# Patient Record
Sex: FEMALE | Race: WHITE | NOT HISPANIC OR LATINO | Employment: FULL TIME | URBAN - METROPOLITAN AREA
[De-identification: names, ages, dates, MRNs, and addresses within clinical notes are randomized per-mention and may not be internally consistent; named-entity substitution may affect disease eponyms.]

---

## 2024-01-25 ENCOUNTER — LAB (OUTPATIENT)
Dept: LAB | Facility: LAB | Age: 44
End: 2024-01-25
Payer: COMMERCIAL

## 2024-01-25 ENCOUNTER — OFFICE VISIT (OUTPATIENT)
Dept: PRIMARY CARE | Facility: CLINIC | Age: 44
End: 2024-01-25
Payer: COMMERCIAL

## 2024-01-25 VITALS
HEART RATE: 65 BPM | BODY MASS INDEX: 28.17 KG/M2 | DIASTOLIC BLOOD PRESSURE: 80 MMHG | WEIGHT: 165 LBS | SYSTOLIC BLOOD PRESSURE: 128 MMHG | HEIGHT: 64 IN | OXYGEN SATURATION: 100 %

## 2024-01-25 DIAGNOSIS — E78.49 OTHER HYPERLIPIDEMIA: ICD-10-CM

## 2024-01-25 DIAGNOSIS — Z00.00 WELLNESS EXAMINATION: ICD-10-CM

## 2024-01-25 DIAGNOSIS — R74.01 ELEVATED TRANSAMINASE LEVEL: Primary | ICD-10-CM

## 2024-01-25 DIAGNOSIS — K76.0 FATTY LIVER: ICD-10-CM

## 2024-01-25 DIAGNOSIS — Z12.11 SCREENING FOR COLON CANCER: ICD-10-CM

## 2024-01-25 DIAGNOSIS — Z12.31 ENCOUNTER FOR SCREENING MAMMOGRAM FOR MALIGNANT NEOPLASM OF BREAST: Primary | ICD-10-CM

## 2024-01-25 DIAGNOSIS — R74.8 ELEVATED ALKALINE PHOSPHATASE LEVEL: ICD-10-CM

## 2024-01-25 PROBLEM — E78.5 HYPERLIPIDEMIA: Status: ACTIVE | Noted: 2024-01-25

## 2024-01-25 LAB
ALBUMIN SERPL BCP-MCNC: 5 G/DL (ref 3.4–5)
ALP SERPL-CCNC: 200 U/L (ref 33–110)
ALT SERPL W P-5'-P-CCNC: 105 U/L (ref 7–45)
ANION GAP SERPL CALC-SCNC: 12 MMOL/L (ref 10–20)
AST SERPL W P-5'-P-CCNC: 52 U/L (ref 9–39)
BASOPHILS # BLD AUTO: 0.08 X10*3/UL (ref 0–0.1)
BASOPHILS NFR BLD AUTO: 1 %
BILIRUB SERPL-MCNC: 2 MG/DL (ref 0–1.2)
BUN SERPL-MCNC: 14 MG/DL (ref 6–23)
CALCIUM SERPL-MCNC: 10.1 MG/DL (ref 8.6–10.3)
CHLORIDE SERPL-SCNC: 101 MMOL/L (ref 98–107)
CHOLEST SERPL-MCNC: 259 MG/DL (ref 0–199)
CHOLESTEROL/HDL RATIO: 3.9
CO2 SERPL-SCNC: 28 MMOL/L (ref 21–32)
CREAT SERPL-MCNC: 0.74 MG/DL (ref 0.5–1.05)
EGFRCR SERPLBLD CKD-EPI 2021: >90 ML/MIN/1.73M*2
EOSINOPHIL # BLD AUTO: 0.23 X10*3/UL (ref 0–0.7)
EOSINOPHIL NFR BLD AUTO: 2.8 %
ERYTHROCYTE [DISTWIDTH] IN BLOOD BY AUTOMATED COUNT: 12.6 % (ref 11.5–14.5)
GLUCOSE SERPL-MCNC: 91 MG/DL (ref 74–99)
HCT VFR BLD AUTO: 43.1 % (ref 36–46)
HDLC SERPL-MCNC: 67 MG/DL
HGB BLD-MCNC: 14.1 G/DL (ref 12–16)
IMM GRANULOCYTES # BLD AUTO: 0.03 X10*3/UL (ref 0–0.7)
IMM GRANULOCYTES NFR BLD AUTO: 0.4 % (ref 0–0.9)
LDLC SERPL CALC-MCNC: 141 MG/DL
LYMPHOCYTES # BLD AUTO: 2.36 X10*3/UL (ref 1.2–4.8)
LYMPHOCYTES NFR BLD AUTO: 28.7 %
MCH RBC QN AUTO: 31.1 PG (ref 26–34)
MCHC RBC AUTO-ENTMCNC: 32.7 G/DL (ref 32–36)
MCV RBC AUTO: 95 FL (ref 80–100)
MONOCYTES # BLD AUTO: 0.7 X10*3/UL (ref 0.1–1)
MONOCYTES NFR BLD AUTO: 8.5 %
NEUTROPHILS # BLD AUTO: 4.83 X10*3/UL (ref 1.2–7.7)
NEUTROPHILS NFR BLD AUTO: 58.6 %
NON HDL CHOLESTEROL: 192 MG/DL (ref 0–149)
NRBC BLD-RTO: 0 /100 WBCS (ref 0–0)
PLATELET # BLD AUTO: 340 X10*3/UL (ref 150–450)
POTASSIUM SERPL-SCNC: 4.4 MMOL/L (ref 3.5–5.3)
PROT SERPL-MCNC: 7.5 G/DL (ref 6.4–8.2)
RBC # BLD AUTO: 4.54 X10*6/UL (ref 4–5.2)
SODIUM SERPL-SCNC: 137 MMOL/L (ref 136–145)
TRIGL SERPL-MCNC: 253 MG/DL (ref 0–149)
TSH SERPL-ACNC: 3.35 MIU/L (ref 0.44–3.98)
VLDL: 51 MG/DL (ref 0–40)
WBC # BLD AUTO: 8.2 X10*3/UL (ref 4.4–11.3)

## 2024-01-25 PROCEDURE — 84443 ASSAY THYROID STIM HORMONE: CPT

## 2024-01-25 PROCEDURE — 80061 LIPID PANEL: CPT

## 2024-01-25 PROCEDURE — 86706 HEP B SURFACE ANTIBODY: CPT

## 2024-01-25 PROCEDURE — 86803 HEPATITIS C AB TEST: CPT

## 2024-01-25 PROCEDURE — 84466 ASSAY OF TRANSFERRIN: CPT

## 2024-01-25 PROCEDURE — 80053 COMPREHEN METABOLIC PANEL: CPT

## 2024-01-25 PROCEDURE — 86704 HEP B CORE ANTIBODY TOTAL: CPT

## 2024-01-25 PROCEDURE — 85025 COMPLETE CBC W/AUTO DIFF WBC: CPT

## 2024-01-25 PROCEDURE — 87340 HEPATITIS B SURFACE AG IA: CPT

## 2024-01-25 PROCEDURE — 84080 ASSAY ALKALINE PHOSPHATASES: CPT

## 2024-01-25 PROCEDURE — 99396 PREV VISIT EST AGE 40-64: CPT | Performed by: FAMILY MEDICINE

## 2024-01-25 PROCEDURE — 36415 COLL VENOUS BLD VENIPUNCTURE: CPT

## 2024-01-25 PROCEDURE — 1036F TOBACCO NON-USER: CPT | Performed by: FAMILY MEDICINE

## 2024-01-25 RX ORDER — AMLODIPINE BESYLATE 5 MG/1
5 TABLET ORAL DAILY
COMMUNITY

## 2024-01-25 RX ORDER — METOPROLOL TARTRATE 50 MG/1
50 TABLET ORAL 2 TIMES DAILY
COMMUNITY
Start: 2023-10-09

## 2024-01-25 ASSESSMENT — PATIENT HEALTH QUESTIONNAIRE - PHQ9
1. LITTLE INTEREST OR PLEASURE IN DOING THINGS: NOT AT ALL
2. FEELING DOWN, DEPRESSED OR HOPELESS: NOT AT ALL
SUM OF ALL RESPONSES TO PHQ9 QUESTIONS 1 AND 2: 0

## 2024-01-25 NOTE — PROGRESS NOTES
Patient presents for periodic surveillance of chronic medical problems.     Subjective  Josee Chávez is a 44 y.o. female who presents for Follow-up.  [facility]  Wellness visit.  Since last seen 1/22, has been to cardiology and pulmonary for atypical chest pain and dyspnea.  No cardiac or pulmonary cause found.  Trial of amlodipine has helped.    Father has liver cancer.   Was in ER in October at [facility] with elevated transaminases.  Had US which suggests fatty liver. States had three beers the night before and she typically doesn't drink   Recommend mammogram.  Recommend colon cancer screening,. No family history.  Discussed options, she chooses colonoscopy.  Review of Systems   All other systems reviewed and are negative.  .    Objective     Visit Vitals  /80   Pulse 65      Physical Exam  Vitals and nursing note reviewed.   Constitutional:       General: She is not in acute distress.     Appearance: Normal appearance. She is not toxic-appearing.   HENT:      Head: Normocephalic and atraumatic.   Cardiovascular:      Rate and Rhythm: Normal rate and regular rhythm.      Heart sounds: No murmur heard.  Pulmonary:      Effort: Pulmonary effort is normal.      Breath sounds: Normal breath sounds.   Musculoskeletal:      Cervical back: Neck supple. No rigidity.      Comments:     Skin:     General: Skin is warm and dry.   Neurological:      General: No focal deficit present.      Mental Status: She is alert and oriented to person, place, and time.   Psychiatric:         Mood and Affect: Mood normal.         Behavior: Behavior normal.         Assessment/Plan   Problem List Items Addressed This Visit       Hyperlipidemia    Relevant Orders    CBC and Auto Differential    Comprehensive Metabolic Panel    Lipid Panel    TSH with reflex to Free T4 if abnormal     Other Visit Diagnoses       Encounter for screening mammogram for malignant neoplasm of breast    -  Primary    Relevant Orders    BI mammo bilateral  screening tomosynthesis    Screening for colon cancer        Relevant Orders    Colonoscopy Screening; Average Risk Patient    Wellness examination                       Adenike Aleman MD

## 2024-01-25 NOTE — PATIENT INSTRUCTIONS
Recommend repeat liver labs, if normal now can monitor periodically.  If abnormal will recommend further evaluation.

## 2024-01-26 LAB
HBV CORE AB SER QL: NONREACTIVE
HBV SURFACE AB SER-ACNC: 229.1 MIU/ML
HBV SURFACE AG SERPL QL IA: NONREACTIVE
HCV AB SER QL: NONREACTIVE
TRANSFERRIN SERPL-MCNC: 253 MG/DL (ref 200–360)

## 2024-01-29 ENCOUNTER — HOSPITAL ENCOUNTER (OUTPATIENT)
Dept: RADIOLOGY | Facility: HOSPITAL | Age: 44
Discharge: HOME | End: 2024-01-29
Payer: COMMERCIAL

## 2024-01-29 ENCOUNTER — TELEPHONE (OUTPATIENT)
Dept: PRIMARY CARE | Facility: CLINIC | Age: 44
End: 2024-01-29
Payer: COMMERCIAL

## 2024-01-29 DIAGNOSIS — Z12.31 ENCOUNTER FOR SCREENING MAMMOGRAM FOR MALIGNANT NEOPLASM OF BREAST: ICD-10-CM

## 2024-01-29 PROCEDURE — 77063 BREAST TOMOSYNTHESIS BI: CPT | Performed by: RADIOLOGY

## 2024-01-29 PROCEDURE — 77063 BREAST TOMOSYNTHESIS BI: CPT

## 2024-01-29 PROCEDURE — 77067 SCR MAMMO BI INCL CAD: CPT | Performed by: RADIOLOGY

## 2024-01-29 NOTE — TELEPHONE ENCOUNTER
PT CALLING TO ASK WHAT THE NEXT STEP IS FOR HER HIGH LIVER LEVELS. SHE HAS THE RESULTS AND JUST WANTS TO KNOW IF SHE NEEDS FURTHER TESTING.

## 2024-01-31 LAB
ALP BONE SERPL-CCNC: 67 U/L (ref 0–55)
ALP LIVER SERPL-CCNC: 149 U/L (ref 0–94)
ALP OTHER SERPL-CCNC: 0 U/L
ALP SERPL-CCNC: 216 U/L (ref 40–120)

## 2024-05-08 ENCOUNTER — APPOINTMENT (OUTPATIENT)
Dept: GASTROENTEROLOGY | Facility: HOSPITAL | Age: 44
End: 2024-05-08
Payer: COMMERCIAL

## 2024-07-31 ENCOUNTER — LAB (OUTPATIENT)
Dept: LAB | Facility: LAB | Age: 44
End: 2024-07-31
Payer: COMMERCIAL

## 2024-07-31 ENCOUNTER — CLINICAL SUPPORT (OUTPATIENT)
Dept: GASTROENTEROLOGY | Facility: HOSPITAL | Age: 44
End: 2024-07-31
Payer: COMMERCIAL

## 2024-07-31 ENCOUNTER — OFFICE VISIT (OUTPATIENT)
Dept: GASTROENTEROLOGY | Facility: HOSPITAL | Age: 44
End: 2024-07-31
Payer: COMMERCIAL

## 2024-07-31 VITALS
HEIGHT: 64 IN | WEIGHT: 168 LBS | HEART RATE: 94 BPM | OXYGEN SATURATION: 99 % | TEMPERATURE: 97.7 F | BODY MASS INDEX: 28.68 KG/M2 | DIASTOLIC BLOOD PRESSURE: 80 MMHG | SYSTOLIC BLOOD PRESSURE: 115 MMHG

## 2024-07-31 DIAGNOSIS — R74.01 ELEVATED TRANSAMINASE LEVEL: Primary | ICD-10-CM

## 2024-07-31 DIAGNOSIS — R74.8 ELEVATED ALKALINE PHOSPHATASE LEVEL: ICD-10-CM

## 2024-07-31 DIAGNOSIS — R74.01 ELEVATED TRANSAMINASE LEVEL: ICD-10-CM

## 2024-07-31 DIAGNOSIS — K76.0 FATTY LIVER: ICD-10-CM

## 2024-07-31 DIAGNOSIS — K76.0 METABOLIC DYSFUNCTION-ASSOCIATED STEATOTIC LIVER DISEASE (MASLD): ICD-10-CM

## 2024-07-31 LAB
ALBUMIN SERPL BCP-MCNC: 4.6 G/DL (ref 3.4–5)
ALP SERPL-CCNC: 119 U/L (ref 33–110)
ALT SERPL W P-5'-P-CCNC: 32 U/L (ref 7–45)
ANION GAP SERPL CALC-SCNC: 14 MMOL/L (ref 10–20)
AST SERPL W P-5'-P-CCNC: 26 U/L (ref 9–39)
BASOPHILS # BLD AUTO: 0.07 X10*3/UL (ref 0–0.1)
BASOPHILS NFR BLD AUTO: 0.8 %
BILIRUB DIRECT SERPL-MCNC: 0.3 MG/DL (ref 0–0.3)
BILIRUB SERPL-MCNC: 1.6 MG/DL (ref 0–1.2)
BUN SERPL-MCNC: 13 MG/DL (ref 6–23)
CALCIUM SERPL-MCNC: 10.2 MG/DL (ref 8.6–10.6)
CERULOPLASMIN SERPL-MCNC: 28.9 MG/DL (ref 20–60)
CHLORIDE SERPL-SCNC: 101 MMOL/L (ref 98–107)
CO2 SERPL-SCNC: 27 MMOL/L (ref 21–32)
CREAT SERPL-MCNC: 0.81 MG/DL (ref 0.5–1.05)
EGFRCR SERPLBLD CKD-EPI 2021: >90 ML/MIN/1.73M*2
EOSINOPHIL # BLD AUTO: 0.25 X10*3/UL (ref 0–0.7)
EOSINOPHIL NFR BLD AUTO: 3 %
ERYTHROCYTE [DISTWIDTH] IN BLOOD BY AUTOMATED COUNT: 12.5 % (ref 11.5–14.5)
FERRITIN SERPL-MCNC: 131 NG/ML (ref 8–150)
GLUCOSE SERPL-MCNC: 95 MG/DL (ref 74–99)
HAV AB SER QL IA: NONREACTIVE
HCT VFR BLD AUTO: 40.4 % (ref 36–46)
HGB BLD-MCNC: 13.3 G/DL (ref 12–16)
IGA SERPL-MCNC: 136 MG/DL (ref 70–400)
IGG SERPL-MCNC: 1260 MG/DL (ref 700–1600)
IGM SERPL-MCNC: 101 MG/DL (ref 40–230)
IMM GRANULOCYTES # BLD AUTO: 0.03 X10*3/UL (ref 0–0.7)
IMM GRANULOCYTES NFR BLD AUTO: 0.4 % (ref 0–0.9)
INR PPP: 0.9 (ref 0.9–1.1)
IRON SATN MFR SERPL: 21 % (ref 25–45)
IRON SERPL-MCNC: 78 UG/DL (ref 35–150)
LYMPHOCYTES # BLD AUTO: 2.19 X10*3/UL (ref 1.2–4.8)
LYMPHOCYTES NFR BLD AUTO: 26.1 %
MCH RBC QN AUTO: 30.8 PG (ref 26–34)
MCHC RBC AUTO-ENTMCNC: 32.9 G/DL (ref 32–36)
MCV RBC AUTO: 94 FL (ref 80–100)
MONOCYTES # BLD AUTO: 0.66 X10*3/UL (ref 0.1–1)
MONOCYTES NFR BLD AUTO: 7.9 %
NEUTROPHILS # BLD AUTO: 5.18 X10*3/UL (ref 1.2–7.7)
NEUTROPHILS NFR BLD AUTO: 61.8 %
NRBC BLD-RTO: 0 /100 WBCS (ref 0–0)
PLATELET # BLD AUTO: 353 X10*3/UL (ref 150–450)
POTASSIUM SERPL-SCNC: 4.3 MMOL/L (ref 3.5–5.3)
PROT SERPL-MCNC: 7.8 G/DL (ref 6.4–8.2)
PROTHROMBIN TIME: 10.4 SECONDS (ref 9.8–12.8)
RBC # BLD AUTO: 4.32 X10*6/UL (ref 4–5.2)
SODIUM SERPL-SCNC: 138 MMOL/L (ref 136–145)
TIBC SERPL-MCNC: 368 UG/DL (ref 240–445)
UIBC SERPL-MCNC: 290 UG/DL (ref 110–370)
WBC # BLD AUTO: 8.4 X10*3/UL (ref 4.4–11.3)

## 2024-07-31 PROCEDURE — 83516 IMMUNOASSAY NONANTIBODY: CPT

## 2024-07-31 PROCEDURE — 82784 ASSAY IGA/IGD/IGG/IGM EACH: CPT

## 2024-07-31 PROCEDURE — 3008F BODY MASS INDEX DOCD: CPT | Performed by: INTERNAL MEDICINE

## 2024-07-31 PROCEDURE — 85610 PROTHROMBIN TIME: CPT

## 2024-07-31 PROCEDURE — 80053 COMPREHEN METABOLIC PANEL: CPT

## 2024-07-31 PROCEDURE — 82104 ALPHA-1-ANTITRYPSIN PHENO: CPT

## 2024-07-31 PROCEDURE — 82728 ASSAY OF FERRITIN: CPT

## 2024-07-31 PROCEDURE — 36415 COLL VENOUS BLD VENIPUNCTURE: CPT

## 2024-07-31 PROCEDURE — 86381 MITOCHONDRIAL ANTIBODY EACH: CPT

## 2024-07-31 PROCEDURE — 82248 BILIRUBIN DIRECT: CPT

## 2024-07-31 PROCEDURE — 86256 FLUORESCENT ANTIBODY TITER: CPT

## 2024-07-31 PROCEDURE — 86708 HEPATITIS A ANTIBODY: CPT

## 2024-07-31 PROCEDURE — 85025 COMPLETE CBC W/AUTO DIFF WBC: CPT

## 2024-07-31 PROCEDURE — 83550 IRON BINDING TEST: CPT

## 2024-07-31 PROCEDURE — 91200 LIVER ELASTOGRAPHY: CPT | Performed by: INTERNAL MEDICINE

## 2024-07-31 PROCEDURE — 86038 ANTINUCLEAR ANTIBODIES: CPT

## 2024-07-31 PROCEDURE — 99244 OFF/OP CNSLTJ NEW/EST MOD 40: CPT | Performed by: INTERNAL MEDICINE

## 2024-07-31 PROCEDURE — 82390 ASSAY OF CERULOPLASMIN: CPT

## 2024-07-31 PROCEDURE — 83540 ASSAY OF IRON: CPT

## 2024-07-31 PROCEDURE — 86015 ACTIN ANTIBODY EACH: CPT

## 2024-07-31 ASSESSMENT — PAIN SCALES - GENERAL: PAINLEVEL: 0-NO PAIN

## 2024-07-31 NOTE — LETTER
August 6, 2024     Adenike Aleman MD  [address] [facility] [place] [address]    Patient: Josee hCávez   YOB: 1980   Date of Visit: 7/31/2024       Dear Dr. Adenike Aleman MD:    Thank you for referring Josee Chávez to me for evaluation. Below are my notes for this consultation.  If you have questions, please do not hesitate to call me. I look forward to following your patient along with you.       Sincerely,     Heriberto Us MD      CC: No Recipients  ______________________________________________________________________________________    Subjective     Evaluation of abnormal liver enzymes, steatotic liver disease.    History of Present Illness:   Josee Chávez is a 44 y.o. female who presents to the [facility] at [facility] for an initial Hepatology evaluation.    She is referred by her primary care physician Dr. Adenike Aleman.    She has been told in the past that she has fatty liver.  She has never had any symptoms of acute hepatitis, jaundice, itching, rashes.  She does report some dry skin.    Six months ago her liver enzymes were elevated.  Her alkaline phosphatase was 200, AST 52  total bili 2.0.    Several months prior to that, she did have a ER hospitalization for chest pain.  At that time her liver function tests were first noted to be elevated.  Her ALT was 121 her  her alkaline phosphatase was 96 and total bilirubin was 1.7.    Liver ultrasound was completed on 10/3/2023 at the time of this ER visit.  The ultrasound noted that the liver was diffusely echogenic consistent with hepatic steatosis.    Metabolic risk factors - most notably overweight and obesity.  She does take antihypertensives.  However she does not have a history of hypertension.  Post COVID infection she developed palpitations and now he takes metoprolol.  There is no prior history of prediabetes or diabetes.  She is not on  treatment for high cholesterol: However a lipid panel from earlier this year demonstrated high triglycerides and high total cholesterol levels.    She was taking a compounded semaglutide formulation for weight loss.  She recalls starting this in  and took it for 6 to 12 months her weight was up to 190 pounds at that time she had some GI side effects from therapy.  She lost about 30 pounds.  After her ER hospitalization in October she stopped taking semaglutide - she was concerned for hepatotoxicity, when she learned about her abnormal liver enzymes.    Review of Systems  Review of Systems  See the new patient questionnaire for a  comprehensive self-reported review of systems  Past Medical History   has a past medical history of Encounter for gynecological examination (general) (routine) without abnormal findings (2021), Other conditions influencing health status, Personal history of diseases of the skin and subcutaneous tissue (2020), Personal history of other medical treatment, and Unspecified tubal pregnancy without intrauterine pregnancy ([facility]) (2020).     Surgical history includes an appendectomy in .  She underwent hysterectomy in  for abnormal bleeding.  She also has a history of tubal ligation in .  She has never underwent colon cancer screening.  There is no history of colonoscopy.      Social History   reports that she has quit smoking. Her smoking use included cigarettes. She has never used smokeless tobacco. She reports that she does not drink alcohol and does not use drugs.   She is single  -25-year-old child.  Lives in [facility]  She works as a respiratory therapist.  She has a sister in [place].  She is considering moving.    Family History  family history includes Breast cancer in her mother; Diabetes in her father, maternal grandmother, and paternal grandmother; Liver cancer in her father.   Importantly, her father is  at the age of 73.  He  " from liver cancer.  He had cirrhosis likely due to GOTTI.    Allergies  No Known Allergies    Medications  Current Outpatient Medications   Medication Instructions   • amLODIPine (NORVASC) 5 mg, oral, Daily   • metoprolol tartrate (LOPRESSOR) 50 mg, oral, 2 times daily        Objective   Visit Vitals  /80   Pulse 94   Temp 36.5 °C (97.7 °F)          2021     9:37 AM 2021    10:43 AM 2021    12:06 PM 2021    10:33 AM 2022     8:22 AM 2024     7:49 AM 2024     3:25 PM   Vitals   Systolic 122 142 128 118 120 128 115   Diastolic 80 80 82 78 76 80 80   Heart Rate  72  64 72 65 94   Temp 36.7 °C (98 °F) 36.4 °C (97.5 °F)  36.2 °C (97.1 °F) 36.9 °C (98.4 °F)  36.5 °C (97.7 °F)   Height (in) 1.626 m (5' 4\") 1.626 m (5' 4\")  1.626 m (5' 4\") 1.626 m (5' 4\") 1.626 m (5' 4\") 1.626 m (5' 4\")   Weight (lb) 163.36 169.13  169.25 178.13 165 168   BMI 28.04 kg/m2 29.03 kg/m2  29.05 kg/m2 30.58 kg/m2 28.32 kg/m2 28.84 kg/m2   BSA (m2) 1.83 m2 1.86 m2  1.86 m2 1.91 m2 1.84 m2 1.85 m2   Visit Report      Report Report     Physical Exam  Vitals and nursing note reviewed.   Constitutional:       Appearance: Normal appearance.   HENT:      Head: Normocephalic and atraumatic.      Mouth/Throat:      Mouth: Mucous membranes are moist.      Pharynx: Oropharynx is clear.   Eyes:      General: No scleral icterus.     Extraocular Movements: Extraocular movements intact.      Pupils: Pupils are equal, round, and reactive to light.   Cardiovascular:      Rate and Rhythm: Normal rate and regular rhythm.      Heart sounds: No murmur heard.  Pulmonary:      Effort: Pulmonary effort is normal.      Breath sounds: Normal breath sounds.   Abdominal:      General: Abdomen is flat. Bowel sounds are normal.      Palpations: Abdomen is soft.   Musculoskeletal:         General: No swelling. Normal range of motion.      Right lower leg: No edema.      Left lower leg: No edema.   Skin:     Coloration: Skin is not " jaundiced.   Neurological:      General: No focal deficit present.      Mental Status: She is alert and oriented to person, place, and time. Mental status is at baseline.   Psychiatric:         Mood and Affect: Mood normal.         Thought Content: Thought content normal.         Judgment: Judgment normal.         Labs    WBC   Date/Time Value Ref Range Status   01/25/2024 08:50 AM 8.2 4.4 - 11.3 x10*3/uL Final     Hemoglobin   Date/Time Value Ref Range Status   01/25/2024 08:50 AM 14.1 12.0 - 16.0 g/dL Final     Hematocrit   Date/Time Value Ref Range Status   01/25/2024 08:50 AM 43.1 36.0 - 46.0 % Final     MCV   Date/Time Value Ref Range Status   01/25/2024 08:50 AM 95 80 - 100 fL Final     Platelets   Date/Time Value Ref Range Status   01/25/2024 08:50  150 - 450 x10*3/uL Final        Total Protein   Date/Time Value Ref Range Status   01/25/2024 08:50 AM 7.5 6.4 - 8.2 g/dL Final     Albumin   Date/Time Value Ref Range Status   01/25/2024 08:50 AM 5.0 3.4 - 5.0 g/dL Final     AST   Date/Time Value Ref Range Status   01/25/2024 08:50 AM 52 (H) 9 - 39 U/L Final     ALT   Date/Time Value Ref Range Status   01/25/2024 08:50  (H) 7 - 45 U/L Final     Comment:     Patients treated with Sulfasalazine may generate falsely decreased results for ALT.     Alkaline Phosphatase   Date/Time Value Ref Range Status   01/25/2024 08:50  (H) 33 - 110 U/L Final   01/25/2024 08:50  (H) 40 - 120 U/L Final     Bilirubin, Total   Date/Time Value Ref Range Status   01/25/2024 08:50 AM 2.0 (H) 0.0 - 1.2 mg/dL Final        AST   Date/Time Value Ref Range Status   01/25/2024 08:50 AM 52 (H) 9 - 39 U/L Final     ALT   Date/Time Value Ref Range Status   01/25/2024 08:50  (H) 7 - 45 U/L Final     Comment:     Patients treated with Sulfasalazine may generate falsely decreased results for ALT.     Alkaline Phosphatase   Date/Time Value Ref Range Status   01/25/2024 08:50  (H) 33 - 110 U/L Final   01/25/2024  08:50  (H) 40 - 120 U/L Final     Bilirubin, Total   Date/Time Value Ref Range Status   01/25/2024 08:50 AM 2.0 (H) 0.0 - 1.2 mg/dL Final     Albumin   Date/Time Value Ref Range Status   01/25/2024 08:50 AM 5.0 3.4 - 5.0 g/dL Final     Total Protein   Date/Time Value Ref Range Status   01/25/2024 08:50 AM 7.5 6.4 - 8.2 g/dL Final      January 2024 cholesterol 259 HDL 67  and triglycerides of 253    Assessment/Plan   Josee Chávez is a 44 y.o. female who presents to liver clinic with abnormal liver enzymes and suspected steatotic liver disease.    She does have persistently elevated liver enzymes dating back to last year and imaging consistent with hepatic steatosis.  Her metabolic risk factors are notably related to obesity and hypertriglyceridemia.    She has a family history of liver disease -GOTTI/MASH cirrhosis and HCC in her father.    Her alkaline phosphatase levels been elevated in the past in addition to her AST and ALT levels.  In this case this broadens the differential diagnosis and brings up the possibility of autoimmune and cholestatic liver diseases.    I do note that she first became aware of high liver enzymes AFTER a substantial amount of weight loss achieved in 2023 while on semaglutide.  It is unclear what her liver enzymes were at baseline (but in 2021 - LFTs were within normal limits), prior to losing weight.  If her liver disease was all metabolically driven, one would have expected improvement or normal liver enzymes with weight loss.    Plan  Liver elastography with Fibroscan was completed in the clinic:  E (median liver stiffness measurement):  5.3  kPa   CAP (controlled attenuation parameter):  323  dB/m     Will obtain an enhanced liver fibrosis panel.    I have asked her to complete a broad panel of serologic testing.  Will check autoimmune markers for autoimmune hepatitis and primary biliary cholangitis.  Will also check iron levels, ceruloplasmin, alpha-1 antitrypsin  levels, etc. Earlier this year she tested negative for viral hepatitis.    I did discuss the possibility of a liver biopsy if there was some uncertainty regarding her diagnosis.    Instructions      Heriberto Us MD

## 2024-07-31 NOTE — PATIENT INSTRUCTIONS
Welcome to Dr. Heriberto Us's Liver Clinic.  Dr. Us sees patients at the following sites:  [place] [address] Liver[facility]GI Clinic at [facility] [address], [address] at [facility] at [facility], [facility] [address]    Dr. Us's hepatology care coordinator, Kamille CASTRO, can be reached at [phone].  Dr. Us's , Sabrina Tanner, can be reached at [phone].    We discussed your liver enzyme elevations over the last several blood draws spanning over the last 2 to 3 years.    Different explanations ranged from fatty liver disease to autoimmune liver disorder such as autoimmune hepatitis and primary biliary cholangitis (PBC).    I would like to first do blood work.  Before you leave today referring to a FibroScan of the liver,    Based on these results, if there is ongoing liver enzyme elevations and abnormalities, we may need to consider liver biopsy as well.

## 2024-07-31 NOTE — PROGRESS NOTES
Subjective     Evaluation of abnormal liver enzymes, steatotic liver disease.    History of Present Illness:   Josee Chávez is a 44 y.o. female who presents to the [facility] at [facility] for an initial Hepatology evaluation.    She is referred by her primary care physician Dr. Adenike Aleman.    She has been told in the past that she has fatty liver.  She has never had any symptoms of acute hepatitis, jaundice, itching, rashes.  She does report some dry skin.    Six months ago her liver enzymes were elevated.  Her alkaline phosphatase was 200, AST 52  total bili 2.0.    Several months prior to that, she did have a ER hospitalization for chest pain.  At that time her liver function tests were first noted to be elevated.  Her ALT was 121 her  her alkaline phosphatase was 96 and total bilirubin was 1.7.    Liver ultrasound was completed on 10/3/2023 at the time of this ER visit.  The ultrasound noted that the liver was diffusely echogenic consistent with hepatic steatosis.    Metabolic risk factors - most notably overweight and obesity.  She does take antihypertensives.  However she does not have a history of hypertension.  Post COVID infection she developed palpitations and now he takes metoprolol.  There is no prior history of prediabetes or diabetes.  She is not on treatment for high cholesterol: However a lipid panel from earlier this year demonstrated high triglycerides and high total cholesterol levels.    She was taking a compounded semaglutide formulation for weight loss.  She recalls starting this in 2023 and took it for 6 to 12 months her weight was up to 190 pounds at that time she had some GI side effects from therapy.  She lost about 30 pounds.  After her ER hospitalization in October she stopped taking semaglutide - she was concerned for hepatotoxicity, when she learned about her abnormal liver enzymes.    Review of Systems  Review of  Systems  See the new patient questionnaire for a  comprehensive self-reported review of systems  Past Medical History   has a past medical history of Encounter for gynecological examination (general) (routine) without abnormal findings (2021), Other conditions influencing health status, Personal history of diseases of the skin and subcutaneous tissue (2020), Personal history of other medical treatment, and Unspecified tubal pregnancy without [facility]uter[facility] ([facility]) (2020).     Surgical history includes an appendectomy in .  She underwent hysterectomy in  for abnormal bleeding.  She also has a history of tubal ligation in .  She has never underwent colon cancer screening.  There is no history of colonoscopy.      Social History   reports that she has quit smoking. Her smoking use included cigarettes. She has never used smokeless tobacco. She reports that she does not drink alcohol and does not use drugs.   She is single  -25-year-old child.  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  She is considering moving.    Family History  family history includes Breast cancer in her mother; Diabetes in her father, maternal grandmother, and paternal grandmother; Liver cancer in her father.   Importantly, her father is  at the age of 73.  He  from liver cancer.  He had cirrhosis likely due to GOTTI.    Allergies  No Known Allergies    Medications  Current Outpatient Medications   Medication Instructions    amLODIPine (NORVASC) 5 mg, oral, Daily    metoprolol tartrate (LOPRESSOR) 50 mg, oral, 2 times daily        Objective   Visit Vitals  /80   Pulse 94   Temp 36.5 °C (97.7 °F)          2021     9:37 AM 2021    10:43 AM 2021    12:06 PM 2021    10:33 AM 2022     8:22 AM 2024     7:49 AM 2024     3:25 PM   Vitals   Systolic 122 142 128 118 120 128 115   Diastolic 80 80 82 78 76 80 80  "  Heart Rate  72  64 72 65 94   Temp 36.7 °C (98 °F) 36.4 °C (97.5 °F)  36.2 °C (97.1 °F) 36.9 °C (98.4 °F)  36.5 °C (97.7 °F)   Height (in) 1.626 m (5' 4\") 1.626 m (5' 4\")  1.626 m (5' 4\") 1.626 m (5' 4\") 1.626 m (5' 4\") 1.626 m (5' 4\")   Weight (lb) 163.36 169.13  169.25 178.13 165 168   BMI 28.04 kg/m2 29.03 kg/m2  29.05 kg/m2 30.58 kg/m2 28.32 kg/m2 28.84 kg/m2   BSA (m2) 1.83 m2 1.86 m2  1.86 m2 1.91 m2 1.84 m2 1.85 m2   Visit Report      Report Report     Physical Exam  Vitals and nursing note reviewed.   Constitutional:       Appearance: Normal appearance.   HENT:      Head: Normocephalic and atraumatic.      Mouth/Throat:      Mouth: Mucous membranes are moist.      Pharynx: Oropharynx is clear.   Eyes:      General: No scleral icterus.     Extraocular Movements: Extraocular movements intact.      Pupils: Pupils are equal, round, and reactive to light.   Cardiovascular:      Rate and Rhythm: Normal rate and regular rhythm.      Heart sounds: No murmur heard.  Pulmonary:      Effort: Pulmonary effort is normal.      Breath sounds: Normal breath sounds.   Abdominal:      General: Abdomen is flat. Bowel sounds are normal.      Palpations: Abdomen is soft.   Musculoskeletal:         General: No swelling. Normal range of motion.      Right lower leg: No edema.      Left lower leg: No edema.   Skin:     Coloration: Skin is not jaundiced.   Neurological:      General: No focal deficit present.      Mental Status: She is alert and oriented to person, place, and time. Mental status is at baseline.   Psychiatric:         Mood and Affect: Mood normal.         Thought Content: Thought content normal.         Judgment: Judgment normal.         Labs    WBC   Date/Time Value Ref Range Status   01/25/2024 08:50 AM 8.2 4.4 - 11.3 x10*3/uL Final     Hemoglobin   Date/Time Value Ref Range Status   01/25/2024 08:50 AM 14.1 12.0 - 16.0 g/dL Final     Hematocrit   Date/Time Value Ref Range Status   01/25/2024 08:50 AM 43.1 36.0 - " 46.0 % Final     MCV   Date/Time Value Ref Range Status   01/25/2024 08:50 AM 95 80 - 100 fL Final     Platelets   Date/Time Value Ref Range Status   01/25/2024 08:50  150 - 450 x10*3/uL Final        Total Protein   Date/Time Value Ref Range Status   01/25/2024 08:50 AM 7.5 6.4 - 8.2 g/dL Final     Albumin   Date/Time Value Ref Range Status   01/25/2024 08:50 AM 5.0 3.4 - 5.0 g/dL Final     AST   Date/Time Value Ref Range Status   01/25/2024 08:50 AM 52 (H) 9 - 39 U/L Final     ALT   Date/Time Value Ref Range Status   01/25/2024 08:50  (H) 7 - 45 U/L Final     Comment:     Patients treated with Sulfasalazine may generate falsely decreased results for ALT.     Alkaline Phosphatase   Date/Time Value Ref Range Status   01/25/2024 08:50  (H) 33 - 110 U/L Final   01/25/2024 08:50  (H) 40 - 120 U/L Final     Bilirubin, Total   Date/Time Value Ref Range Status   01/25/2024 08:50 AM 2.0 (H) 0.0 - 1.2 mg/dL Final        AST   Date/Time Value Ref Range Status   01/25/2024 08:50 AM 52 (H) 9 - 39 U/L Final     ALT   Date/Time Value Ref Range Status   01/25/2024 08:50  (H) 7 - 45 U/L Final     Comment:     Patients treated with Sulfasalazine may generate falsely decreased results for ALT.     Alkaline Phosphatase   Date/Time Value Ref Range Status   01/25/2024 08:50  (H) 33 - 110 U/L Final   01/25/2024 08:50  (H) 40 - 120 U/L Final     Bilirubin, Total   Date/Time Value Ref Range Status   01/25/2024 08:50 AM 2.0 (H) 0.0 - 1.2 mg/dL Final     Albumin   Date/Time Value Ref Range Status   01/25/2024 08:50 AM 5.0 3.4 - 5.0 g/dL Final     Total Protein   Date/Time Value Ref Range Status   01/25/2024 08:50 AM 7.5 6.4 - 8.2 g/dL Final      January 2024 cholesterol 259 HDL 67  and triglycerides of 253    Assessment/Plan   Josee Chávez is a 44 y.o. female who presents to liver clinic with abnormal liver enzymes and suspected steatotic liver disease.    She does have persistently  elevated liver enzymes dating back to last year and imaging consistent with hepatic steatosis.  Her metabolic risk factors are notably related to obesity and hypertriglyceridemia.    She has a family history of liver disease -GOTTI/MASH cirrhosis and HCC in her father.    Her alkaline phosphatase levels been elevated in the past in addition to her AST and ALT levels.  In this case this broadens the differential diagnosis and brings up the possibility of autoimmune and cholestatic liver diseases.    I do note that she first became aware of high liver enzymes AFTER a substantial amount of weight loss achieved in 2023 while on semaglutide.  It is unclear what her liver enzymes were at baseline (but in 2021 - LFTs were within normal limits), prior to losing weight.  If her liver disease was all metabolically driven, one would have expected improvement or normal liver enzymes with weight loss.    Plan  Liver elastography with Fibroscan was completed in the clinic:  E (median liver stiffness measurement):  5.3  kPa   CAP (controlled attenuation parameter):  323  dB/m     Will obtain an enhanced liver fibrosis panel.    I have asked her to complete a broad panel of serologic testing.  Will check autoimmune markers for autoimmune hepatitis and primary biliary cholangitis.  Will also check iron levels, ceruloplasmin, alpha-1 antitrypsin levels, etc. Earlier this year she tested negative for viral hepatitis.    I did discuss the possibility of a liver biopsy if there was some uncertainty regarding her diagnosis.    Instructions      Heriberto Us MD

## 2024-08-01 LAB
GLIADIN PEPTIDE IGA SER IA-ACNC: <1 U/ML
TTG IGA SER IA-ACNC: <1 U/ML

## 2024-08-01 NOTE — PROGRESS NOTES
Results:  E (median liver stiffness measurement):  5.3  kPa  CAP (controlled attenuation parameter):  323  dB/m    Interpretation:  This was a technically adequate study. The Fibrosis score is consistent with Metavir F0. The CAP score is consistent with 34 - 66 % hepatocyte steatosis (steatosis stage 2 of 3 ) .    Heriberto Us MD  Hepatology

## 2024-08-02 LAB
ANA SER QL HEP2 SUBST: NEGATIVE
MITOCHONDRIA AB SER QL IF: NEGATIVE

## 2024-08-03 LAB
GLIADIN PEPTIDE IGG SER IA-ACNC: <0.56 FLU (ref 0–4.99)
TTG IGG SER IA-ACNC: <0.82 FLU (ref 0–4.99)

## 2024-08-04 LAB
A1AT PHENOTYP SERPL-IMP: NORMAL
A1AT SERPL-MCNC: 137 MG/DL (ref 90–200)

## 2024-08-05 LAB — SMOOTH MUSCLE AB SER QL IF: ABNORMAL

## 2024-08-09 DIAGNOSIS — R74.01 ELEVATED TRANSAMINASE LEVEL: ICD-10-CM

## 2024-08-09 DIAGNOSIS — K76.0 FATTY LIVER: ICD-10-CM

## 2024-08-09 DIAGNOSIS — K76.0 METABOLIC DYSFUNCTION-ASSOCIATED STEATOTIC LIVER DISEASE (MASLD): ICD-10-CM

## 2024-08-12 DIAGNOSIS — Z12.11 COLON CANCER SCREENING: ICD-10-CM

## 2024-10-09 RX ORDER — MULTIVITAMIN
1 TABLET ORAL DAILY
COMMUNITY

## 2024-10-09 RX ORDER — OMEPRAZOLE 40 MG/1
40 CAPSULE, DELAYED RELEASE ORAL
COMMUNITY

## 2024-10-11 ENCOUNTER — HOSPITAL ENCOUNTER (OUTPATIENT)
Dept: GASTROENTEROLOGY | Facility: CLINIC | Age: 44
Setting detail: OUTPATIENT SURGERY
Discharge: HOME | End: 2024-10-11
Payer: COMMERCIAL

## 2024-10-11 VITALS
DIASTOLIC BLOOD PRESSURE: 73 MMHG | SYSTOLIC BLOOD PRESSURE: 107 MMHG | HEART RATE: 73 BPM | OXYGEN SATURATION: 98 % | TEMPERATURE: 97.2 F | RESPIRATION RATE: 16 BRPM

## 2024-10-11 DIAGNOSIS — Z12.11 COLON CANCER SCREENING: ICD-10-CM

## 2024-10-11 PROCEDURE — 7100000009 HC PHASE TWO TIME - INITIAL BASE CHARGE

## 2024-10-11 PROCEDURE — 3700000013 HC SEDATION LEVEL 5+ TIME - EACH ADDITIONAL 15 MINUTES

## 2024-10-11 PROCEDURE — 2500000004 HC RX 250 GENERAL PHARMACY W/ HCPCS (ALT 636 FOR OP/ED): Performed by: INTERNAL MEDICINE

## 2024-10-11 PROCEDURE — 7100000010 HC PHASE TWO TIME - EACH INCREMENTAL 1 MINUTE

## 2024-10-11 PROCEDURE — 45378 DIAGNOSTIC COLONOSCOPY: CPT | Performed by: INTERNAL MEDICINE

## 2024-10-11 PROCEDURE — 3700000012 HC SEDATION LEVEL 5+ TIME - INITIAL 15 MINUTES 5/> YEARS

## 2024-10-11 RX ORDER — MEPERIDINE HYDROCHLORIDE 25 MG/ML
INJECTION INTRAMUSCULAR; INTRAVENOUS; SUBCUTANEOUS AS NEEDED
Status: COMPLETED | OUTPATIENT
Start: 2024-10-11 | End: 2024-10-11

## 2024-10-11 RX ORDER — MIDAZOLAM HYDROCHLORIDE 5 MG/ML
INJECTION, SOLUTION INTRAMUSCULAR; INTRAVENOUS AS NEEDED
Status: COMPLETED | OUTPATIENT
Start: 2024-10-11 | End: 2024-10-11

## 2024-10-11 ASSESSMENT — PAIN - FUNCTIONAL ASSESSMENT
PAIN_FUNCTIONAL_ASSESSMENT: 0-10

## 2024-10-11 ASSESSMENT — PAIN SCALES - GENERAL
PAINLEVEL_OUTOF10: 0 - NO PAIN

## 2024-10-11 NOTE — H&P
History Of Present Illness  Josee Chávez is a 44 y.o. female presenting with colon cancer screening.     Past Medical History  Past Medical History:   Diagnosis Date    Encounter for gynecological examination (general) (routine) without abnormal findings 02/19/2021    Pap test, as part of routine gynecological examination    Hypertension     Other conditions influencing health status     History of pregnancy    Personal history of diseases of the skin and subcutaneous tissue 03/30/2020    History of keloid of skin    Personal history of other medical treatment     History of screening mammography    PONV (postoperative nausea and vomiting)     Unspecified tubal pregnancy without intrauterine pregnancy ([facility]) 02/17/2020    Ectopic pregnancy, tubal     Surgical History  Past Surgical History:   Procedure Laterality Date    CT ANGIO CORONARY ART WITH HEARTFLOW IF SCORE >30%  7/13/2022    CT ANGIO CORONARY ART WITH HEARTFLOW IF SCORE >30% 7/13/2022    OTHER SURGICAL HISTORY  02/12/2020    Appendectomy    OTHER SURGICAL HISTORY  02/12/2020    Surgery    OTHER SURGICAL HISTORY  02/17/2020    Hysterectomy    OTHER SURGICAL HISTORY  05/05/2021    Tubal ligation     Social History  She reports that she has quit smoking. Her smoking use included cigarettes. She has never used smokeless tobacco. She reports that she does not drink alcohol and does not use drugs.    Family History  Family History   Problem Relation Name Age of Onset    Breast cancer Mother      Liver cancer Father      Diabetes Father      Diabetes Maternal Grandmother      Diabetes Paternal Grandmother          Allergies  No Known Allergies  Review of Systems  Pre-sedation Evaluation:  ASA Classification - ASA 2 - Patient with mild systemic disease with no functional limitations  Mallampati Score - II (hard and soft palate, upper portion of tonsils and uvula visible)    Physical Exam  Vitals and nursing note reviewed.   Constitutional:       Appearance:  Normal appearance.   HENT:      Head: Normocephalic.      Mouth/Throat:      Mouth: Mucous membranes are moist.      Pharynx: Oropharynx is clear.   Eyes:      Conjunctiva/sclera: Conjunctivae normal.      Pupils: Pupils are equal, round, and reactive to light.   Cardiovascular:      Rate and Rhythm: Normal rate and regular rhythm.      Heart sounds: Normal heart sounds.   Pulmonary:      Effort: Pulmonary effort is normal.      Breath sounds: Normal breath sounds.   Abdominal:      General: Abdomen is flat. Bowel sounds are normal.      Palpations: Abdomen is soft.   Musculoskeletal:      Cervical back: Normal range of motion and neck supple.   Skin:     General: Skin is warm and dry.   Neurological:      General: No focal deficit present.      Mental Status: She is alert and oriented to person, place, and time.   Psychiatric:         Behavior: Behavior normal.          Last Recorded Vitals  There were no vitals taken for this visit.     Assessment/Plan   Problem List Items Addressed This Visit    None  Visit Diagnoses       Colon cancer screening        Relevant Orders    Colonoscopy Screening; Average Risk Patient               PTA/Current Medications:  (Not in a hospital admission)    Current Outpatient Medications   Medication Sig Dispense Refill    amLODIPine (Norvasc) 5 mg tablet Take 1 tablet (5 mg) by mouth once daily.      metoprolol tartrate (Lopressor) 50 mg tablet Take 1 tablet by mouth twice a day.      multivitamin tablet Take 1 tablet by mouth once daily.      omeprazole (PriLOSEC) 40 mg DR capsule Take 1 capsule (40 mg) by mouth once daily.       No current facility-administered medications for this encounter.     Zac Tinsley DO

## 2025-01-23 ENCOUNTER — APPOINTMENT (OUTPATIENT)
Dept: PRIMARY CARE | Facility: CLINIC | Age: 45
End: 2025-01-23
Payer: COMMERCIAL

## 2025-01-23 VITALS
BODY MASS INDEX: 29.53 KG/M2 | DIASTOLIC BLOOD PRESSURE: 78 MMHG | HEIGHT: 64 IN | WEIGHT: 173 LBS | HEART RATE: 76 BPM | SYSTOLIC BLOOD PRESSURE: 138 MMHG

## 2025-01-23 DIAGNOSIS — Z23 NEED FOR INFLUENZA VACCINATION: ICD-10-CM

## 2025-01-23 DIAGNOSIS — Z00.00 WELLNESS EXAMINATION: Primary | ICD-10-CM

## 2025-01-23 DIAGNOSIS — Z00.00 HEALTH CARE MAINTENANCE: ICD-10-CM

## 2025-01-23 DIAGNOSIS — Z12.31 SCREENING MAMMOGRAM FOR BREAST CANCER: ICD-10-CM

## 2025-01-23 PROBLEM — K76.0 FATTY LIVER: Status: ACTIVE | Noted: 2025-01-23

## 2025-01-23 PROBLEM — Z86.79 HISTORY OF CORONARY VASOSPASM: Status: ACTIVE | Noted: 2025-01-23

## 2025-01-23 PROCEDURE — 1036F TOBACCO NON-USER: CPT | Performed by: FAMILY MEDICINE

## 2025-01-23 PROCEDURE — 99396 PREV VISIT EST AGE 40-64: CPT | Performed by: FAMILY MEDICINE

## 2025-01-23 PROCEDURE — 90471 IMMUNIZATION ADMIN: CPT | Performed by: FAMILY MEDICINE

## 2025-01-23 PROCEDURE — 90673 RIV3 VACCINE NO PRESERV IM: CPT | Performed by: FAMILY MEDICINE

## 2025-01-23 PROCEDURE — 3008F BODY MASS INDEX DOCD: CPT | Performed by: FAMILY MEDICINE

## 2025-01-23 RX ORDER — OMEPRAZOLE 40 MG/1
40 CAPSULE, DELAYED RELEASE ORAL
Qty: 90 CAPSULE | Refills: 3 | Status: SHIPPED | OUTPATIENT
Start: 2025-01-23

## 2025-01-23 NOTE — PROGRESS NOTES
Subjective  Josee Chávez is a 45 y.o. female who presents for Annual Exam.  [facility]  Wellness visit.  Has had workup for liver enzymes, felt fatty liver  Went to cardiology for atypical chest pressure, felt coronary vasospasm, on amlodipine now and it is helping  Due for mammogram, influenza vaccine, agrees to both.   Recent colonoscopy, ten years advised.  Review of Systems   All other systems reviewed and are negative.  .    No Known Allergies    Current Outpatient Medications on File Prior to Visit   Medication Sig Dispense Refill    amLODIPine (Norvasc) 5 mg tablet Take 1 tablet (5 mg) by mouth once daily.      metoprolol tartrate (Lopressor) 50 mg tablet Take 1 tablet by mouth twice a day.      multivitamin tablet Take 1 tablet by mouth once daily.      omeprazole (PriLOSEC) 40 mg DR capsule Take 1 capsule (40 mg) by mouth once daily.       No current facility-administered medications on file prior to visit.       Patient Active Problem List   Diagnosis    Hyperlipidemia    Fatty liver       Objective     Visit Vitals  /78 (BP Location: Left arm, Patient Position: Sitting)   Pulse 76      Physical Exam  Vitals and nursing note reviewed.   Constitutional:       General: She is not in acute distress.     Appearance: Normal appearance. She is not toxic-appearing.   HENT:      Head: Normocephalic and atraumatic.   Cardiovascular:      Rate and Rhythm: Normal rate and regular rhythm.      Heart sounds: No murmur heard.  Pulmonary:      Effort: Pulmonary effort is normal.      Breath sounds: Normal breath sounds.   Musculoskeletal:      Cervical back: Neck supple. No rigidity.      Comments:     Skin:     General: Skin is warm and dry.   Neurological:      General: No focal deficit present.      Mental Status: She is alert and oriented to person, place, and time.   Psychiatric:         Mood and Affect: Mood normal.         Behavior: Behavior normal.         Assessment/Plan   Problem List Items Addressed  This Visit    None  Visit Diagnoses       Wellness examination    -  Primary    Relevant Orders    CBC and Auto Differential    Comprehensive Metabolic Panel    Lipid Panel    TSH with reflex to Free T4 if abnormal    Vitamin B12    Screening mammogram for breast cancer        Relevant Orders    BI mammo bilateral screening tomosynthesis    Need for influenza vaccination        Relevant Orders    Flu vaccine, trivalent, preservative free, no egg protein, age 18y+ (Flublok) (Completed)             Followup annually, call concerns.       Adenike Aleman MD

## 2025-01-31 ENCOUNTER — HOSPITAL ENCOUNTER (OUTPATIENT)
Dept: RADIOLOGY | Facility: HOSPITAL | Age: 45
Discharge: HOME | End: 2025-01-31
Payer: COMMERCIAL

## 2025-01-31 DIAGNOSIS — Z12.31 SCREENING MAMMOGRAM FOR BREAST CANCER: ICD-10-CM

## 2025-01-31 PROCEDURE — 77067 SCR MAMMO BI INCL CAD: CPT

## 2025-01-31 PROCEDURE — 77063 BREAST TOMOSYNTHESIS BI: CPT | Performed by: RADIOLOGY

## 2025-01-31 PROCEDURE — 77067 SCR MAMMO BI INCL CAD: CPT | Performed by: RADIOLOGY

## 2026-01-23 ENCOUNTER — APPOINTMENT (OUTPATIENT)
Age: 46
End: 2026-01-23
Payer: COMMERCIAL